# Patient Record
(demographics unavailable — no encounter records)

---

## 2024-10-11 NOTE — ADDENDUM
[FreeTextEntry1] :   I, Lety Leyva, am scribing for and the presence of Dr. Promise Donahue the following sections: HPI, PMH,Family/social history, ROS, Physical Exam, Assessment / Plan.  I, Promise Donahue, hereby attest that the medical record entry for this patient accurately reflects signatures/notations that I made on the Date of Service in my capacity as an Attending Physician when I treated/diagnosed the above patient. I do hereby attest that this information is true, accurate and complete to the best of my knowledge.  I was present for the entire visit and supervised the entire visit including assessing the patient, conducting exam and establishing the plan of care.  I personally performed the evaluation and management services and agree with the plan as outlined above.

## 2024-10-11 NOTE — HISTORY OF PRESENT ILLNESS
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 He states that there are some periods of time where he does not feel it at all. He has a Kardia device - which showed multiple episodes of PACS, some runs of AT, but nothing gloria. He states that he has the episodes when he goes from walking around to laying down. He states it is typically positional when it happens. He also states he can trigger it when he takes really deep breaths. He recently had a CCTA that was normal.

## 2024-10-11 NOTE — REVIEW OF SYSTEMS
[Palpitations] : palpitations [Negative] : Heme/Lymph [SOB] : no shortness of breath [Dyspnea on exertion] : not dyspnea during exertion [Syncope] : no syncope [FreeTextEntry5] : see HPI

## 2024-10-11 NOTE — CARDIOLOGY SUMMARY
[___] : [unfilled] [LVEF ___%] : LVEF [unfilled]% [de-identified] : Sinus, normal axis/intervals 10/11/24: Nsr @ 97 bpm

## 2024-10-11 NOTE — HISTORY OF PRESENT ILLNESS
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 He states that there are some periods of time where he does not feel it at all. He has a Kardia device - which showed multiple episodes of PACS, some runs of AT, but nothing gloria. He states that he has the episodes when he goes from walking around to laying down. He states it is typically positional when it happens. He also states he can trigger it when he takes really deep breaths. He recently had a CCTA that was normal.

## 2024-10-11 NOTE — DISCUSSION/SUMMARY
[FreeTextEntry1] : 40 y/o M with palpitations which correspond to sustained SVT up to 240 bpm on ambulatory telemetry.  Now s/p successful ablation of AVNRT, who presents for follow-up.  1) PACS - Patient s/p successful ablation of AVNRT in 2020. He has been doing well. He still checks his heart rate when he has palpitations via Kardia. Events correlate with PACS and no sustained arrythmia. We will order a monitor to quantify PAC burden and if he is having any sustained events.  - He should continue his metoprolol succinate 25 mg daily - He will f/u in 6 months or sooner if he has any questions or concerns. We will follow-up with him once we get the monitor results.  [EKG obtained to assist in diagnosis and management of assessed problem(s)] : EKG obtained to assist in diagnosis and management of assessed problem(s)

## 2024-10-11 NOTE — CARDIOLOGY SUMMARY
[___] : [unfilled] [LVEF ___%] : LVEF [unfilled]% [de-identified] : Sinus, normal axis/intervals 10/11/24: Nsr @ 97 bpm

## 2024-10-11 NOTE — DISCUSSION/SUMMARY
[FreeTextEntry1] : 42 y/o M with palpitations which correspond to sustained SVT up to 240 bpm on ambulatory telemetry.  Now s/p successful ablation of AVNRT, who presents for follow-up.  1) PACS - Patient s/p successful ablation of AVNRT in 2020. He has been doing well. He still checks his heart rate when he has palpitations via Kardia. Events correlate with PACS and no sustained arrythmia. We will order a monitor to quantify PAC burden and if he is having any sustained events.  - He should continue his metoprolol succinate 25 mg daily - He will f/u in 6 months or sooner if he has any questions or concerns. We will follow-up with him once we get the monitor results.  [EKG obtained to assist in diagnosis and management of assessed problem(s)] : EKG obtained to assist in diagnosis and management of assessed problem(s)

## 2024-10-11 NOTE — PHYSICAL EXAM
[General Appearance - Well Developed] : well developed [Normal Appearance] : normal appearance [Well Groomed] : well groomed [General Appearance - Well Nourished] : well nourished [No Deformities] : no deformities [General Appearance - In No Acute Distress] : no acute distress [Respiration, Rhythm And Depth] : normal respiratory rhythm and effort [Exaggerated Use Of Accessory Muscles For Inspiration] : no accessory muscle use [Auscultation Breath Sounds / Voice Sounds] : lungs were clear to auscultation bilaterally [Heart Rate And Rhythm] : heart rate and rhythm were normal [Heart Sounds] : normal S1 and S2 [Murmurs] : no murmurs present [Abdomen Soft] : soft [Abdomen Tenderness] : non-tender [Abdomen Mass (___ Cm)] : no abdominal mass palpated [Abnormal Walk] : normal gait [Gait - Sufficient For Exercise Testing] : the gait was sufficient for exercise testing [Nail Clubbing] : no clubbing of the fingernails [Cyanosis, Localized] : no localized cyanosis [Petechial Hemorrhages (___cm)] : no petechial hemorrhages [] : no ischemic changes [Well Developed] : well developed [Well Nourished] : well nourished [No Acute Distress] : no acute distress [Normal Conjunctiva] : normal conjunctiva [Normal Venous Pressure] : normal venous pressure [No Carotid Bruit] : no carotid bruit [Normal S1, S2] : normal S1, S2 [No Murmur] : no murmur [No Rub] : no rub [No Gallop] : no gallop [Clear Lung Fields] : clear lung fields [Good Air Entry] : good air entry [No Respiratory Distress] : no respiratory distress  [Soft] : abdomen soft [Non Tender] : non-tender [No Masses/organomegaly] : no masses/organomegaly [Normal Bowel Sounds] : normal bowel sounds [Normal Gait] : normal gait [No Edema] : no edema [No Cyanosis] : no cyanosis [No Clubbing] : no clubbing [No Varicosities] : no varicosities [No Rash] : no rash [No Skin Lesions] : no skin lesions [Moves all extremities] : moves all extremities [No Focal Deficits] : no focal deficits [Normal Speech] : normal speech [Alert and Oriented] : alert and oriented [Normal memory] : normal memory

## 2024-12-11 NOTE — HISTORY OF PRESENT ILLNESS
[FreeTextEntry1] : 41M w/ HTN, SVT (s/p AVNRT ablation - 2020), aortic dilatation, and HLD who presents for cardiac evaluation.   12/6/24 OV: pt returns today for f/u. Overall feeling well since last seen, no new complaints. Recently saw Dr. Donahue. 7/15/24 TH: pt returns today via TH for results of labs and CCTA. Overall feeling well, no new complaints. 6/21/24 OV: pt returns today for f/u. Pt now endorses chest pain, describes as a muscle-like pain, with exertion. Denies any c/o shortness of breath, palpitations, dizziness or syncope.  12/1/23 OV: pt returns today for f/u , still gets occasional "extra beats", but no persistent episodes of palpitations. No associated dizziness or syncope. Compliant w/ all meds.  6/16/23 OV: returns today for f/u, no new complaints. 12/23/22 OV: 6 month f/u. Reports PVCs post heavy drinking. ET unlimited. 12/19/22 TTE reviewed, WNL. 6/3/22 OV: 6 month f/u. No new complaints. States he still has intermittent palpitations but very short runs only (monitors on apple watch). Complaint with toprol.  12/6/24 ECG: NSR at 74 bpm, nl axis 6/21/24 ECG: ST at 104 bpm, nl axis, VARUN 12/1/23 ECG: NSR at 84 bpm, LAE, nl axis 6/16/23 ECG: NSR at 88 bpm, VARUN 12/23/22 ECG: ST at 109 bpm, VARUN, NS STTWC 6/3/22 ECG: NSR at 88 bpm, nl axis  12/17/21 ECG: NSR at 93 bpm, nl axis, VARUN, NS STTWC  6/18/21 ECG: ST at 127 bpm, nl axis ECG: NSR at 87 bpm, righward axis, VARUN, NS STTWC

## 2024-12-11 NOTE — ASSESSMENT
[FreeTextEntry1] : # chest pain w/ exertion - 6/2024 TTE c/w nl LV sys fxn, EF 71% - 6/2024 CCTA c/w CACS 0, nl cors - results discussed in detail with pt  # paroxysmal SVT - still endorses intermittent palpitations - s/p ablation of atypical AVNRT - 10/2020 (Dr. Donahue) - cont toprol 25 mg qd  # HTN - BP today 116/78 - stable - cont Toprol 25 mg qd - 6/2024 TTE c/w nl LV sys fxn, EF 71%  # Aortic root dilatation - referred to aortic registry - f/u with Dr. Mejia 2025 - cont to monitor - 6/2024 TTE c/w Ao root, Asc ao, and Ao arch wnl for BSA - results discussed in detail with pt  # HLD - 4/2023 labs reviewed,  - 6/2024 lipid panel c/w TG 72, , HDL 52, LDL 99 - cont crestor 5 mg qd - check labs   RTC 1 year

## 2025-04-14 NOTE — ASSESSMENT
[FreeTextEntry1] :   I have reviewed the patient's medical records, diagnostic images during the time of this office consultation and have made the following recommendation. CT completed of the thoracic aorta. The reports were reviewed and noted in the chart, I independently reviewed and interpreted images and reports, and I reviewed the films/CD and agree.  Review of the imaging shows his  aortic pathology has remained stable and does not require surgical intervention. Plan  - [facility] ___ with ____. - Continue medication regimen. - Follow up with cardiologist and PCP. - Blood pressure management. - Discussed signs and symptoms that warrant emergency medical attention.

## 2025-04-14 NOTE — DATA REVIEWED
[FreeTextEntry1] : Echo 3/2/21:  1. Normal right and left ventricular systolic function.  2. Bicuspid aortic valve Seivers type 0 (AP variant). Mild aortic insufficiency.  3. No pericardial effusion.  4. Dilated aortic root 4.0 cm.    CTA chest 3/29/21:  Borderline ectasia of the aortic root measuring 3.8 cm.  Ectatic celiac trunk measuring 1 cm.    Echo 12/19/22:  1. Left ventricular ejection fraction is visually estimated at 70 to 75%.  2. Left ventricular systolic function is normal without regional wall motion abnormalities.  3. Mild (Grade 1) left ventricular diastolic dysfunction.  4. Normal right ventricular size and systolic function.  5. Normal atria.  6. No significant valvular disease.  7. No pericardial effusion seen    CTA chest 3/10/23: The ascending thoracic aorta at the level of the sinuses of Valsalva measures 3.8 cm; ascending thoracic aorta just distal to the sinuses of Valsalva measures 3.3 cm; ascending thoracic aorta the level of the main pulmonary artery measures 3.0 cm; the aortic arch just distal to the left subclavian artery measures 2.4 cm; the distal descending thoracic aorta at the level of the main pulmonary artery measures 2.2 cm; distal to the thoracic aorta at the level of diaphragmatic hiatus measures 2.1 cm. Negative for stenosis of the proximal aspect of the great vessels. Negative for enlargement of the main pulmonary artery. No george central pulmonary embolism.

## 2025-04-14 NOTE — PROCEDURE
[FreeTextEntry1] : Dr. Mejia reviewed the indications for surgery, and used our webpage [email or web address] <[email or web address]> to illustrate the aorta and anatomy of the heart. Those indications are the following: size greater than 5.0 cm, symptomatic aneurysms, family history of aortic dissection or aneurysm death with a size greater than 4.5 cm, other necessary cardiac procedures such as coronary artery bypass grafting or valvular disorders with an aneurysm greater than 4.5 cm, or connective tissue disorders with an aneurysm size greater than 4.5 cm. The patient does not meet size criteria for surgical intervention at the time.  Dr. Mejia discussed activity restrictions with the patient, and would advise exercise at a moderate amount with no heavy lifting over one third of body weight, and avoiding heart rates that exceed 140 beats per minute. In addition, every patient should abstain from tobacco abuse and to avoid all illicit drug use, especially stimulants such as cocaine or methamphetamine. Dr. Mejia also counseled regarding maintaining a healthy heart diet, and losing any excessive weight as this also put undue stress on both the aorta and entire cardiovascular system. First degree family members should be screened for bicuspid valve disease, and ascending aortic aneurysms.   Patient was advised to view the educational video prior to this visit regarding aortic pathology, risk factors, surgical procedures, and lifestyle modifications. Video can be retrieved at [email or web address].

## 2025-04-14 NOTE — HISTORY OF PRESENT ILLNESS
[FreeTextEntry1] : 42-year-old male with a past medical hx of sustained SVT s/p AVNRT ablation 10/8/20, mild pulmonary HTN, covid infection 12/2020, presents for two year follow up visit for evaluation and management of possible bicuspid aortic valve and dilated aortic root.  CTA CORONARIES 7/9/2024 Significant motion artifact noted during the study which limits evaluation. Cardiac: 1.  The calcium score is 0  Agatston units . 2.  Mid RCA, mid LCx and ramus intermedius are suboptimally visualized but appear non obstructive. 3.  OM1 and RPL are probably normal allowing for motion artifact. 4.  Remaining segments appear angiographically normal. Non-cardiac: 1. No significant findings.

## 2025-04-25 NOTE — PROCEDURE
[FreeTextEntry1] : Dr. Mejia reviewed the indications for surgery, and used our webpage [email or web address] <[email or web address]> to illustrate the aorta and anatomy of the heart. Those indications are the following: size greater than 5.0 cm, symptomatic aneurysms, family history of aortic dissection or aneurysm death with a size greater than 4.5 cm, other necessary cardiac procedures such as coronary artery bypass grafting or valvular disorders with an aneurysm greater than 4.5 cm, or connective tissue disorders with an aneurysm size greater than 4.5 cm. The patient does not meet size criteria for surgical intervention at the time.  Dr. Mejia discussed activity restrictions with the patient, and would advise exercise at a moderate amount with no heavy lifting over one third of body weight, and avoiding heart rates that exceed 140 beats per minute. In addition, every patient should abstain from tobacco abuse and to avoid all illicit drug use, especially stimulants such as cocaine or methamphetamine. Dr. Mejia also counseled regarding maintaining a healthy heart diet, and losing any excessive weight as this also put undue stress on both the aorta and entire cardiovascular system. First degree family members should be screened for bicuspid valve disease, and ascending aortic aneurysms.   Patient was advised to view the educational video prior to this visit regarding aortic pathology, risk factors, surgical procedures, and lifestyle modifications. Video can be retrieved at [email or web address].

## 2025-04-25 NOTE — PROCEDURE
[FreeTextEntry1] : Dr. Mejia reviewed the indications for surgery, and used our webpage [email or web address] <[email or web address]> to illustrate the aorta and anatomy of the heart. Those indications are the following: size greater than 5.0 cm, symptomatic aneurysms, family history of aortic dissection or aneurysm death with a size greater than 4.5 cm, other necessary cardiac procedures such as coronary artery bypass grafting or valvular disorders with an aneurysm greater than 4.5 cm, or connective tissue disorders with an aneurysm size greater than 4.5 cm. The patient does not meet size criteria for surgical intervention at the time.  Dr. Mejia discussed activity restrictions with the patient, and would advise exercise at a moderate amount with no heavy lifting over one third of body weight, and avoiding heart rates that exceed 140 beats per minute. In addition, every patient should abstain from tobacco abuse and to avoid all illicit drug use, especially stimulants such as cocaine or methamphetamine. Dr. Mejia also counseled regarding maintaining a healthy heart diet, and losing any excessive weight as this also put undue stress on both the aorta and entire cardiovascular system. First degree family members should be screened for bicuspid valve disease, and ascending aortic aneurysms.   Patient was advised to view the educational video prior to this visit regarding aortic pathology, risk factors, surgical procedures, and lifestyle modifications. Video can be retrieved at [email or web address].

## 2025-04-25 NOTE — PHYSICAL EXAM
[Sclera] : the sclera and conjunctiva were normal [Extraocular Movements] : extraocular movements were intact [Neck Appearance] : the appearance of the neck was normal [] : no respiratory distress [Auscultation Breath Sounds / Voice Sounds] : lungs were clear to auscultation bilaterally [Heart Rate And Rhythm] : heart rate was normal and rhythm regular [Heart Sounds] : normal S1 and S2 [Heart Sounds Gallop] : no gallops [Murmurs] : no murmurs [Heart Sounds Pericardial Friction Rub] : no pericardial rub [Bowel Sounds] : normal bowel sounds [Abdomen Soft] : soft [Abdomen Tenderness] : non-tender [Abnormal Walk] : normal gait [Skin Color & Pigmentation] : normal skin color and pigmentation [Cranial Nerves] : cranial nerves 2-12 were intact [Oriented To Time, Place, And Person] : oriented to person, place, and time [FreeTextEntry2] : No LE edema

## 2025-04-25 NOTE — END OF VISIT
[Time Spent: ___ minutes] : I have spent [unfilled] minutes of time on the encounter which excludes teaching and separately reported services. [FreeTextEntry3] : I, ABHISHEK Valle personally performed the evaluation and management (E/M) services for this established patient who presents today with (a) new problem(s)/exacerbation of (an) existing condition(s).  That E/M includes conducting the clinically appropriate interval history &/or exam, assessing all new/exacerbated conditions, and establishing a new plan of care.  Today, my KEILA, was here to observe &/or participate in the visit & follow plan of care established by me.

## 2025-04-25 NOTE — HISTORY OF PRESENT ILLNESS
[FreeTextEntry1] : 42-year-old male with a past medical hx of sustained SVT s/p AVNRT ablation 10/8/20, mild pulmonary HTN, covid infection 12/2020, presents for two year follow up visit for evaluation and management of possible bicuspid aortic valve and dilated aortic root.  CTA CORONARIES 7/9/2024 Significant motion artifact noted during the study which limits evaluation. Cardiac: 1.  The calcium score is 0  Agatston units . 2.  Mid RCA, mid LCx and ramus intermedius are suboptimally visualized but appear non obstructive. 3.  OM1 and RPL are probably normal allowing for motion artifact. 4.  Remaining segments appear angiographically normal. Non-cardiac: 1. No significant findings.  Patient is doing well and denies recent hospitalization, ER visits, or surgeries. He denies fever, chills, fatigue, headache, blurred vision, dizziness, syncope, chest pain, palpitations, shortness of breath, orthopnea, paroxysmal nocturnal dyspnea, nausea, vomiting, abdominal pain, back pain, BRBPR or swelling to legs.

## 2025-04-25 NOTE — ASSESSMENT
[FreeTextEntry1] : 42-year-old male with a past medical hx of sustained SVT s/p AVNRT ablation 10/8/20, mild pulmonary HTN, covid infection 12/2020, presents for two year follow up visit for evaluation and management of possible bicuspid aortic valve and dilated aortic root.  I have reviewed the patient's medical records, diagnostic images during the time of this office consultation and have made the following recommendation. CT completed of the thoracic aorta. The reports were reviewed and noted in the chart, I independently reviewed and interpreted images and reports, and I reviewed the films/CD and agree.  Review of the imaging shows his aortic pathology has remained stable and does not require surgical intervention. Plan  - Follow up in [facility] 2 yr with TTE.  - Continue medication regimen. - Follow up with cardiologist Dr. Espino and PCP. - Blood pressure management. - Discussed signs and symptoms that warrant emergency medical attention.

## 2025-04-25 NOTE — PROCEDURE
[FreeTextEntry1] : Dr. Mejia reviewed the indications for surgery, and used our webpage [email or web address] <[email or web address]> to illustrate the aorta and anatomy of the heart. Those indications are the following: size greater than 5.0 cm, symptomatic aneurysms, family history of aortic dissection or aneurysm death with a size greater than 4.5 cm, other necessary cardiac procedures such as coronary artery bypass grafting or valvular disorders with an aneurysm greater than 4.5 cm, or connective tissue disorders with an aneurysm size greater than 4.5 cm. The patient does not meet size criteria for surgical intervention at the time.  Dr. Mejia discussed activity restrictions with the patient, and would advise exercise at a moderate amount with no heavy lifting over one third of body weight, and avoiding heart rates that exceed 140 beats per minute. In addition, every patient should abstain from tobacco abuse and to avoid all illicit drug use, especially stimulants such as cocaine or methamphetamine. Dr. Mejia also counseled regarding maintaining a healthy heart diet, and losing any excessive weight as this also put undue stress on both the aorta and entire cardiovascular system. First degree family members should be screened for bicuspid valve disease, and ascending aortic aneurysms.   Patient was advised to view the educational video prior to this visit regarding aortic pathology, risk factors, surgical procedures, and lifestyle modifications. Video can be retrieved at [email or web address].